# Patient Record
Sex: MALE | Employment: OTHER | URBAN - METROPOLITAN AREA
[De-identification: names, ages, dates, MRNs, and addresses within clinical notes are randomized per-mention and may not be internally consistent; named-entity substitution may affect disease eponyms.]

---

## 2021-07-16 ENCOUNTER — OFFICE VISIT (OUTPATIENT)
Dept: UROLOGY | Facility: CLINIC | Age: 81
End: 2021-07-16
Payer: OTHER GOVERNMENT

## 2021-07-16 VITALS
BODY MASS INDEX: 26.24 KG/M2 | SYSTOLIC BLOOD PRESSURE: 160 MMHG | DIASTOLIC BLOOD PRESSURE: 84 MMHG | HEART RATE: 61 BPM | HEIGHT: 73 IN | WEIGHT: 198 LBS

## 2021-07-16 DIAGNOSIS — N40.1 BPH WITH OBSTRUCTION/LOWER URINARY TRACT SYMPTOMS: ICD-10-CM

## 2021-07-16 DIAGNOSIS — R97.20 ELEVATED PSA: Primary | ICD-10-CM

## 2021-07-16 DIAGNOSIS — N13.8 BPH WITH OBSTRUCTION/LOWER URINARY TRACT SYMPTOMS: ICD-10-CM

## 2021-07-16 PROCEDURE — 36415 COLL VENOUS BLD VENIPUNCTURE: CPT | Mod: S$GLB,,, | Performed by: NURSE PRACTITIONER

## 2021-07-16 PROCEDURE — 36415 PR COLLECTION VENOUS BLOOD,VENIPUNCTURE: ICD-10-PCS | Mod: S$GLB,,, | Performed by: NURSE PRACTITIONER

## 2021-07-16 PROCEDURE — 99203 OFFICE O/P NEW LOW 30 MIN: CPT | Mod: S$GLB,,, | Performed by: NURSE PRACTITIONER

## 2021-07-16 PROCEDURE — 99203 PR OFFICE/OUTPT VISIT, NEW, LEVL III, 30-44 MIN: ICD-10-PCS | Mod: S$GLB,,, | Performed by: NURSE PRACTITIONER

## 2021-07-16 RX ORDER — ACETAMINOPHEN 500 MG
TABLET ORAL
COMMUNITY

## 2021-07-16 RX ORDER — OMEPRAZOLE 10 MG/1
10 CAPSULE, DELAYED RELEASE ORAL DAILY
COMMUNITY

## 2021-07-16 RX ORDER — ATORVASTATIN CALCIUM 40 MG/1
40 TABLET, FILM COATED ORAL DAILY
COMMUNITY

## 2021-07-16 RX ORDER — MELOXICAM 15 MG/1
15 TABLET ORAL DAILY
COMMUNITY

## 2021-07-16 RX ORDER — TAMSULOSIN HYDROCHLORIDE 0.4 MG/1
CAPSULE ORAL DAILY
COMMUNITY

## 2021-07-19 ENCOUNTER — TELEPHONE (OUTPATIENT)
Dept: UROLOGY | Facility: CLINIC | Age: 81
End: 2021-07-19

## 2021-07-19 DIAGNOSIS — R97.20 ELEVATED PSA: Primary | ICD-10-CM

## 2021-07-19 LAB
PROSTATE SPECIFIC ANTIGEN, TOTAL: 6 NG/ML
PSA FREE MFR SERPL: 18 % (CALC)
PSA FREE SERPL-MCNC: 1.1 NG/ML

## 2021-08-03 ENCOUNTER — CLINICAL SUPPORT (OUTPATIENT)
Dept: UROLOGY | Facility: CLINIC | Age: 81
End: 2021-08-03
Payer: OTHER GOVERNMENT

## 2021-08-03 DIAGNOSIS — R97.20 ELEVATED PSA: Primary | ICD-10-CM

## 2021-08-03 RX ORDER — CIPROFLOXACIN 500 MG/1
500 TABLET ORAL 2 TIMES DAILY
Qty: 8 TABLET | Refills: 0 | Status: SHIPPED | OUTPATIENT
Start: 2021-08-16 | End: 2021-08-20

## 2021-08-16 ENCOUNTER — TELEPHONE (OUTPATIENT)
Dept: UROLOGY | Facility: CLINIC | Age: 81
End: 2021-08-16

## 2021-08-17 ENCOUNTER — PROCEDURE VISIT (OUTPATIENT)
Dept: UROLOGY | Facility: CLINIC | Age: 81
End: 2021-08-17
Payer: OTHER GOVERNMENT

## 2021-08-17 VITALS
DIASTOLIC BLOOD PRESSURE: 79 MMHG | WEIGHT: 211.81 LBS | OXYGEN SATURATION: 94 % | SYSTOLIC BLOOD PRESSURE: 136 MMHG | BODY MASS INDEX: 27.94 KG/M2 | RESPIRATION RATE: 16 BRPM | HEART RATE: 62 BPM

## 2021-08-17 DIAGNOSIS — R97.20 ELEVATED PSA: Primary | ICD-10-CM

## 2021-08-17 PROCEDURE — 76872 TRANSRECTAL ULTRASOUND W/ BIOPSY: ICD-10-PCS | Mod: S$GLB,,, | Performed by: UROLOGY

## 2021-08-17 PROCEDURE — 55700 TRANSRECTAL ULTRASOUND W/ BIOPSY: ICD-10-PCS | Mod: S$GLB,,, | Performed by: UROLOGY

## 2021-08-17 PROCEDURE — 76872 US TRANSRECTAL: CPT | Mod: S$GLB,,, | Performed by: UROLOGY

## 2021-08-17 PROCEDURE — 55700 TRANSRECTAL ULTRASOUND W/ BIOPSY: CPT | Mod: S$GLB,,, | Performed by: UROLOGY

## 2021-08-23 ENCOUNTER — TELEPHONE (OUTPATIENT)
Dept: UROLOGY | Facility: CLINIC | Age: 81
End: 2021-08-23

## 2024-04-26 DIAGNOSIS — R97.20 ELEVATED PSA: ICD-10-CM

## 2024-04-26 DIAGNOSIS — N40.0 BPH (BENIGN PROSTATIC HYPERPLASIA): Primary | ICD-10-CM

## 2024-04-30 ENCOUNTER — OFFICE VISIT (OUTPATIENT)
Dept: UROLOGY | Facility: CLINIC | Age: 84
End: 2024-04-30
Payer: OTHER GOVERNMENT

## 2024-04-30 VITALS
HEIGHT: 73 IN | SYSTOLIC BLOOD PRESSURE: 142 MMHG | BODY MASS INDEX: 24.65 KG/M2 | HEART RATE: 82 BPM | OXYGEN SATURATION: 98 % | DIASTOLIC BLOOD PRESSURE: 80 MMHG | WEIGHT: 186 LBS

## 2024-04-30 DIAGNOSIS — R97.20 ELEVATED PSA: ICD-10-CM

## 2024-04-30 DIAGNOSIS — N40.1 BENIGN PROSTATIC HYPERPLASIA WITH LOWER URINARY TRACT SYMPTOMS, SYMPTOM DETAILS UNSPECIFIED: ICD-10-CM

## 2024-04-30 DIAGNOSIS — R35.1 NOCTURIA: Primary | ICD-10-CM

## 2024-04-30 LAB — PSA, DIAGNOSTIC: 6.69 NG/ML (ref 0.1–4)

## 2024-04-30 PROCEDURE — 99214 OFFICE O/P EST MOD 30 MIN: CPT | Mod: S$GLB,,, | Performed by: NURSE PRACTITIONER

## 2024-04-30 NOTE — PROGRESS NOTES
Subjective:       Patient ID: Loco De Paz is a 83 y.o. male.    Chief Complaint: Elevated PSA      HPI:   83-year-old male, established patient, last seen August 2021.    Patient has history of an elevated PSA.    Patient a biopsy in August of 2021 due to a PSA of 6.0 with a% free of 18.  All 12 samples were benign.    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    Denies any significant frequency or urgency.  Denies having strain to void.  States he has a pretty good stream start to finish.    He does complain of nocturia.  However, he admits to drinking about 4 glasses of wine per night.      Denies any unexpected weight loss.  Denies any new onset bone pain.  Denies any blood in urine.    No other urinary complaints at this time.         Past Medical History: No past medical history on file.    Past Surgical Historical: No past surgical history on file.     Medications:   Medication List with Changes/Refills   Current Medications    ATORVASTATIN (LIPITOR) 40 MG TABLET    Take 40 mg by mouth once daily.    MELOXICAM (MOBIC) 15 MG TABLET    Take 15 mg by mouth once daily.    OMEGA 3-DHA-EPA-FISH OIL (FISH OIL) 300-1,000 MG CAP    Take by mouth.    OMEPRAZOLE (PRILOSEC) 10 MG CAPSULE    Take 10 mg by mouth once daily.    TAMSULOSIN (FLOMAX) 0.4 MG CAP    Take by mouth once daily.        Past Social History:   Social History     Socioeconomic History    Marital status:     Number of children: 3   Tobacco Use    Smoking status: Never     Passive exposure: Never    Smokeless tobacco: Never   Substance and Sexual Activity    Alcohol use: Yes    Drug use: Never    Sexual activity: Not Currently       Allergies: Review of patient's allergies indicates:  No Known Allergies     Family History: No family history on file.     Review of Systems:  Review of Systems   Constitutional:  Negative for activity change and appetite change.   HENT:  Negative for congestion and dental  problem.    Eyes:  Negative for visual disturbance.   Respiratory:  Negative for chest tightness and shortness of breath.    Cardiovascular:  Negative for chest pain.   Gastrointestinal:  Negative for abdominal distention and abdominal pain.   Genitourinary:  Negative for decreased urine volume, difficulty urinating, dysuria, enuresis, flank pain, frequency, genital sores, hematuria, penile discharge, penile pain, penile swelling, scrotal swelling, testicular pain and urgency.   Musculoskeletal:  Negative for back pain and neck pain.   Skin:  Negative for color change.   Neurological:  Negative for dizziness.   Hematological:  Negative for adenopathy.   Psychiatric/Behavioral:  Negative for agitation, behavioral problems and confusion.        Physical Exam:  Physical Exam  Vitals and nursing note reviewed.   Constitutional:       Appearance: He is well-developed.   HENT:      Head: Normocephalic.   Eyes:      Pupils: Pupils are equal, round, and reactive to light.   Cardiovascular:      Rate and Rhythm: Normal rate and regular rhythm.      Heart sounds: Normal heart sounds.   Pulmonary:      Effort: Pulmonary effort is normal.      Breath sounds: Normal breath sounds.   Abdominal:      General: Bowel sounds are normal.      Palpations: Abdomen is soft.   Musculoskeletal:         General: Normal range of motion.      Cervical back: Normal range of motion and neck supple.   Skin:     General: Skin is warm and dry.   Neurological:      Mental Status: He is alert and oriented to person, place, and time.   Psychiatric:         Behavior: Behavior normal.         Bladder scan: 48 cc    Assessment/Plan:   Elevated PSA:  Check the patient's PSA.  We will notify him of the results.      [facility]  At that time his PSA was 5 +.      2.  BPH /Nocturia: patient's bladder scan is stable at 48 cc.    We discussed decreasing his wine consumption in the evenings.    Patient follow-up in  1 year, sooner if needed.  Problem List Items Addressed This Visit          Renal/    Elevated PSA    Overview     Biopsy 08/17/2021 d/t PSA 6.0 with %free 18.  12/12 Benign         Relevant Orders    Prostate Specific Antigen, Diagnostic     Other Visit Diagnoses       Nocturia    -  Primary    Relevant Orders    POCT Bladder Scan    Benign prostatic hyperplasia with lower urinary tract symptoms, symptom details unspecified        Relevant Orders    POCT Bladder Scan

## 2024-05-09 ENCOUNTER — TELEPHONE (OUTPATIENT)
Dept: UROLOGY | Facility: CLINIC | Age: 84
End: 2024-05-09
Payer: OTHER GOVERNMENT

## 2024-05-09 NOTE — TELEPHONE ENCOUNTER
----- Message from Elmo Garcia NP sent at 5/9/2024  1:10 PM CDT -----  Patient's PSA was 6.0 at time of biopsy in 2021.  PSA is stable at 6.69

## 2024-05-09 NOTE — TELEPHONE ENCOUNTER
Called Mr Adan, gave him the PSA results, he verbalized understanding and will follow up in 6 months.     ----- Message from Elmo Garcia NP sent at 5/9/2024  1:10 PM CDT -----  Patient's PSA was 6.0 at time of biopsy in 2021.  PSA is stable at 6.69

## 2024-11-29 ENCOUNTER — OFFICE VISIT (OUTPATIENT)
Dept: UROLOGY | Facility: CLINIC | Age: 84
End: 2024-11-29
Payer: OTHER GOVERNMENT

## 2024-11-29 VITALS
BODY MASS INDEX: 24.78 KG/M2 | DIASTOLIC BLOOD PRESSURE: 80 MMHG | WEIGHT: 187 LBS | SYSTOLIC BLOOD PRESSURE: 120 MMHG | HEART RATE: 60 BPM | OXYGEN SATURATION: 97 % | RESPIRATION RATE: 18 BRPM | HEIGHT: 73 IN

## 2024-11-29 DIAGNOSIS — N13.8 BPH WITH URINARY OBSTRUCTION: ICD-10-CM

## 2024-11-29 DIAGNOSIS — R33.9 URINARY RETENTION: Primary | ICD-10-CM

## 2024-11-29 DIAGNOSIS — N40.1 BPH WITH URINARY OBSTRUCTION: ICD-10-CM

## 2024-11-29 LAB
BILIRUBIN, UA POC OHS: NEGATIVE
BLOOD, UA POC OHS: ABNORMAL
CLARITY, UA POC OHS: CLEAR
COLOR, UA POC OHS: YELLOW
GLUCOSE, UA POC OHS: NEGATIVE
KETONES, UA POC OHS: NEGATIVE
LEUKOCYTES, UA POC OHS: ABNORMAL
NITRITE, UA POC OHS: POSITIVE
PH, UA POC OHS: 7
PROTEIN, UA POC OHS: NEGATIVE
SPECIFIC GRAVITY, UA POC OHS: 1.01
UROBILINOGEN, UA POC OHS: 0.2

## 2024-11-29 RX ORDER — TAMSULOSIN HYDROCHLORIDE 0.4 MG/1
0.8 CAPSULE ORAL DAILY
Qty: 60 CAPSULE | Refills: 11 | Status: SHIPPED | OUTPATIENT
Start: 2024-11-29 | End: 2025-11-29

## 2024-11-29 NOTE — PROGRESS NOTES
Subjective:       Patient ID: Loco De Paz is a 84 y.o. male.    Chief Complaint: No chief complaint on file.      HPI: 84-year-old male established patient presents today for hospital follow up.  On 11/22/2024 patient presented to [facility] for lower abdominal pain, urgency, and inability to urinate.  Review of records from hospital show His initial bladder scan was 672 mL.  His urinalysis revealed a urinary tract infection however no urine culture was performed.  They inserted an indwelling Caldera catheter and discharged patient on Keflex and Flomax 0.8 mg daily.  Patient presents today for voiding trial.    Patient has a history of BPH with obstruction and was previously on Flomax 0.4 mg daily however it was increased to 0.8 mg at the ER.  He was last seen in April of this year were PSA was 6.69.    Patient a biopsy in August of 2021 due to a PSA of 6.0 with a% free of 18.  All 12 samples were benign.                Past Medical History: History reviewed. No pertinent past medical history.    Past Surgical Historical: History reviewed. No pertinent surgical history.     Medications:   Medication List with Changes/Refills   New Medications    TAMSULOSIN (FLOMAX) 0.4 MG CAP    Take 2 capsules (0.8 mg total) by mouth once daily.   Current Medications    ATORVASTATIN (LIPITOR) 40 MG TABLET    Take 40 mg by mouth once daily.    OMEGA 3-DHA-EPA-FISH OIL (FISH OIL) 300-1,000 MG CAP    Take by mouth.    OMEPRAZOLE (PRILOSEC) 10 MG CAPSULE    Take 10 mg by mouth once daily.   Discontinued Medications    TAMSULOSIN (FLOMAX) 0.4 MG CAP    Take by mouth once daily.        Past Social History:   Social History     Socioeconomic History    Marital status:     Number of children: 3   Tobacco Use    Smoking status: Never     Passive exposure: Never    Smokeless tobacco: Never   Substance and Sexual Activity    Alcohol use: Yes    Drug use: Never    Sexual activity: Not Currently       Allergies: Review of  patient's allergies indicates:  No Known Allergies     Family History: No family history on file.     Review of Systems:  Review of Systems   Constitutional: Negative.    HENT: Negative.     Eyes: Negative.    Respiratory: Negative.     Cardiovascular: Negative.    Gastrointestinal: Negative.    Endocrine: Negative.    Genitourinary: Negative.         Urinary retention   Musculoskeletal: Negative.    Skin: Negative.    Allergic/Immunologic: Negative.    Neurological: Negative.    Hematological: Negative.    Psychiatric/Behavioral: Negative.         Physical Exam:  Physical Exam  Constitutional:       Appearance: Normal appearance.   Cardiovascular:      Rate and Rhythm: Normal rate.   Pulmonary:      Effort: Pulmonary effort is normal.   Abdominal:      General: Bowel sounds are normal.      Palpations: Abdomen is soft.   Genitourinary:     Comments: 16 Polish indwelling Caldera cath draining to gravity without difficulty.  Neurological:      Mental Status: He is alert and oriented to person, place, and time.     Urinalysis:  WBCs negative, RBCs 0-2 large, epithelial +1, bacteria +1, mucus negative, nitrite positive  Assessment/Plan:     Urinary retention:  Patient was found in urinary retention secondary to urinary tract infection.  He completed his Keflex today.  We will continue Flomax 0.8 mg daily.  Patient did pass today's voiding trial we will allow him to go home.  Instructed patient that if he was unable to urinate within 6 hours or having difficulty emptying his bladder to present to the emergency room to have Caldera reinserted and notify us on Monday.  If patient redevelops urinary retention we will schedule him for cystoscopy for further evaluation.    Patient's urinary tract infection was likely related to incomplete bladder emptying on 0.4 Flomax therefore Would like to see patient back in approximately 1 month perform a bladder scan to evaluate if he is experiencing incomplete bladder emptying while on  0.8 of Flomax.  If he is still having incomplete emptying or develops another urinary tract infection prior to next visit we will also schedule for cystoscopy for evaluation of TURP.     Patient's urinalysis is nitrite positive today despite being on antibiotics however otherwise looks unremarkable.  This is possibly due to Pyridium from his bladder spasms..  We will withhold empiric treatment and send urine for culture.  We will change antibiotics if needed    Follow up in 1 month  Problem List Items Addressed This Visit    None  Visit Diagnoses       Urinary retention    -  Primary    Relevant Orders    POCT Urinalysis(Instrument)    BPH with urinary obstruction

## 2024-12-02 ENCOUNTER — TELEPHONE (OUTPATIENT)
Dept: UROLOGY | Facility: CLINIC | Age: 84
End: 2024-12-02
Payer: OTHER GOVERNMENT

## 2024-12-02 NOTE — TELEPHONE ENCOUNTER
Patient was called and informed that there was no growth found on urine culture and to keep follow up appointment. Patient was informed that appointment will be in January 2025. Patient verbalized understanding.   ----- Message from Ivory Robles NP sent at 12/2/2024 11:47 AM CST -----  Please notify patient there was no growth found on urine culture.  Keep scheduled follow up

## 2025-01-13 ENCOUNTER — OFFICE VISIT (OUTPATIENT)
Dept: UROLOGY | Facility: CLINIC | Age: 85
End: 2025-01-13
Payer: OTHER GOVERNMENT

## 2025-01-13 VITALS
BODY MASS INDEX: 24.52 KG/M2 | RESPIRATION RATE: 18 BRPM | HEART RATE: 90 BPM | WEIGHT: 185 LBS | HEIGHT: 73 IN | SYSTOLIC BLOOD PRESSURE: 148 MMHG | DIASTOLIC BLOOD PRESSURE: 68 MMHG | OXYGEN SATURATION: 94 %

## 2025-01-13 DIAGNOSIS — N13.8 BPH WITH URINARY OBSTRUCTION: ICD-10-CM

## 2025-01-13 DIAGNOSIS — N40.1 BPH WITH URINARY OBSTRUCTION: ICD-10-CM

## 2025-01-13 DIAGNOSIS — R97.20 ELEVATED PSA: ICD-10-CM

## 2025-01-13 DIAGNOSIS — R33.9 URINARY RETENTION: Primary | ICD-10-CM

## 2025-01-13 LAB
BILIRUBIN, UA POC OHS: NEGATIVE
BLOOD, UA POC OHS: NEGATIVE
CLARITY, UA POC OHS: CLEAR
COLOR, UA POC OHS: YELLOW
GLUCOSE, UA POC OHS: NEGATIVE
KETONES, UA POC OHS: NEGATIVE
LEUKOCYTES, UA POC OHS: NEGATIVE
NITRITE, UA POC OHS: NEGATIVE
PH, UA POC OHS: 5.5
PROTEIN, UA POC OHS: ABNORMAL
SPECIFIC GRAVITY, UA POC OHS: 1.02
UROBILINOGEN, UA POC OHS: 0.2

## 2025-01-13 PROCEDURE — 99212 OFFICE O/P EST SF 10 MIN: CPT | Mod: S$PBB,,, | Performed by: NURSE PRACTITIONER
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
